# Patient Record
Sex: FEMALE | Race: WHITE | NOT HISPANIC OR LATINO | Employment: OTHER | URBAN - METROPOLITAN AREA
[De-identification: names, ages, dates, MRNs, and addresses within clinical notes are randomized per-mention and may not be internally consistent; named-entity substitution may affect disease eponyms.]

---

## 2017-06-30 ENCOUNTER — CONVERSION ENCOUNTER (OUTPATIENT)
Dept: GENERAL RADIOLOGY | Facility: HOSPITAL | Age: 67
End: 2017-06-30

## 2018-10-22 ENCOUNTER — CONVERSION ENCOUNTER (OUTPATIENT)
Dept: GENERAL RADIOLOGY | Facility: HOSPITAL | Age: 68
End: 2018-10-22

## 2018-12-28 ENCOUNTER — CONVERSION ENCOUNTER (OUTPATIENT)
Dept: OTOLARYNGOLOGY | Facility: CLINIC | Age: 68
End: 2018-12-28

## 2018-12-28 ENCOUNTER — OFFICE VISIT CONVERTED (OUTPATIENT)
Dept: OTOLARYNGOLOGY | Facility: CLINIC | Age: 68
End: 2018-12-28
Attending: OTOLARYNGOLOGY

## 2019-03-12 ENCOUNTER — HOSPITAL ENCOUNTER (OUTPATIENT)
Dept: GENERAL RADIOLOGY | Facility: HOSPITAL | Age: 69
Discharge: HOME | End: 2019-03-12
Attending: NURSE PRACTITIONER

## 2019-03-12 LAB
CREAT BLD-MCNC: 1.4 MG/DL (ref 0.6–1.4)
GFR SERPLBLD BASED ON 1.73 SQ M-ARVRAT: 38 ML/MIN/{1.73_M2}

## 2019-05-15 ENCOUNTER — HOSPITAL ENCOUNTER (OUTPATIENT)
Dept: SLEEP MEDICINE | Facility: HOSPITAL | Age: 69
Discharge: HOME | End: 2019-05-15
Attending: PSYCHIATRY & NEUROLOGY

## 2019-11-14 ENCOUNTER — CONVERSION ENCOUNTER (OUTPATIENT)
Dept: CARDIOLOGY | Facility: CLINIC | Age: 69
End: 2019-11-14

## 2019-11-14 ENCOUNTER — OFFICE VISIT CONVERTED (OUTPATIENT)
Dept: CARDIOLOGY | Facility: CLINIC | Age: 69
End: 2019-11-14
Attending: INTERNAL MEDICINE

## 2020-03-29 ENCOUNTER — HOSPITAL ENCOUNTER (OUTPATIENT)
Dept: URGENT CARE | Facility: CLINIC | Age: 70
Discharge: HOME | End: 2020-03-29

## 2020-07-22 ENCOUNTER — HOSPITAL ENCOUNTER (OUTPATIENT)
Dept: SLEEP MEDICINE | Facility: HOSPITAL | Age: 70
Discharge: HOME | End: 2020-07-22
Attending: PSYCHIATRY & NEUROLOGY

## 2021-05-15 VITALS
DIASTOLIC BLOOD PRESSURE: 80 MMHG | WEIGHT: 176 LBS | SYSTOLIC BLOOD PRESSURE: 170 MMHG | HEIGHT: 64 IN | HEART RATE: 60 BPM | BODY MASS INDEX: 30.05 KG/M2

## 2021-05-16 VITALS
OXYGEN SATURATION: 95 % | DIASTOLIC BLOOD PRESSURE: 80 MMHG | SYSTOLIC BLOOD PRESSURE: 131 MMHG | BODY MASS INDEX: 28.38 KG/M2 | HEIGHT: 64 IN | TEMPERATURE: 97.6 F | RESPIRATION RATE: 18 BRPM | HEART RATE: 75 BPM | WEIGHT: 166.25 LBS

## 2021-05-22 ENCOUNTER — TRANSCRIBE ORDERS (OUTPATIENT)
Dept: ADMINISTRATIVE | Facility: HOSPITAL | Age: 71
End: 2021-05-22

## 2021-05-22 DIAGNOSIS — Z12.31 SCREENING MAMMOGRAM, ENCOUNTER FOR: Primary | ICD-10-CM

## 2021-07-08 ENCOUNTER — HOSPITAL ENCOUNTER (OUTPATIENT)
Dept: MAMMOGRAPHY | Facility: HOSPITAL | Age: 71
Discharge: HOME | End: 2021-07-08
Admitting: NURSE PRACTITIONER

## 2021-07-08 DIAGNOSIS — Z12.31 SCREENING MAMMOGRAM, ENCOUNTER FOR: ICD-10-CM

## 2021-07-08 PROCEDURE — 77063 BREAST TOMOSYNTHESIS BI: CPT | Performed by: RADIOLOGY

## 2021-07-08 PROCEDURE — 77063 BREAST TOMOSYNTHESIS BI: CPT

## 2021-07-08 PROCEDURE — 77067 SCR MAMMO BI INCL CAD: CPT | Performed by: RADIOLOGY

## 2021-07-08 PROCEDURE — 77067 SCR MAMMO BI INCL CAD: CPT

## 2021-07-09 ENCOUNTER — APPOINTMENT (OUTPATIENT)
Dept: MAMMOGRAPHY | Facility: HOSPITAL | Age: 71
End: 2021-07-09

## 2021-07-21 ENCOUNTER — OFFICE VISIT (OUTPATIENT)
Dept: SLEEP MEDICINE | Facility: HOSPITAL | Age: 71
End: 2021-07-21

## 2021-07-21 VITALS
SYSTOLIC BLOOD PRESSURE: 154 MMHG | WEIGHT: 195 LBS | BODY MASS INDEX: 33.29 KG/M2 | DIASTOLIC BLOOD PRESSURE: 93 MMHG | HEART RATE: 65 BPM | OXYGEN SATURATION: 97 % | HEIGHT: 64 IN

## 2021-07-21 DIAGNOSIS — G47.33 OSA (OBSTRUCTIVE SLEEP APNEA): Primary | ICD-10-CM

## 2021-07-21 PROCEDURE — G0463 HOSPITAL OUTPT CLINIC VISIT: HCPCS

## 2022-07-20 ENCOUNTER — TRANSCRIBE ORDERS (OUTPATIENT)
Dept: ADMINISTRATIVE | Facility: HOSPITAL | Age: 72
End: 2022-07-20

## 2022-07-20 DIAGNOSIS — I70.0 ATHEROSCLEROSIS OF AORTA: ICD-10-CM

## 2022-07-20 DIAGNOSIS — M54.50 LOW BACK PAIN, UNSPECIFIED BACK PAIN LATERALITY, UNSPECIFIED CHRONICITY, UNSPECIFIED WHETHER SCIATICA PRESENT: Primary | ICD-10-CM

## 2022-08-09 ENCOUNTER — APPOINTMENT (OUTPATIENT)
Dept: CT IMAGING | Facility: HOSPITAL | Age: 72
End: 2022-08-09

## 2022-08-09 ENCOUNTER — APPOINTMENT (OUTPATIENT)
Dept: MRI IMAGING | Facility: HOSPITAL | Age: 72
End: 2022-08-09

## 2022-08-30 ENCOUNTER — HOSPITAL ENCOUNTER (OUTPATIENT)
Dept: MRI IMAGING | Facility: HOSPITAL | Age: 72
Discharge: HOME | End: 2022-08-30

## 2022-08-30 ENCOUNTER — HOSPITAL ENCOUNTER (OUTPATIENT)
Dept: CT IMAGING | Facility: HOSPITAL | Age: 72
Discharge: HOME | End: 2022-08-30

## 2022-08-30 DIAGNOSIS — M54.50 LOW BACK PAIN, UNSPECIFIED BACK PAIN LATERALITY, UNSPECIFIED CHRONICITY, UNSPECIFIED WHETHER SCIATICA PRESENT: ICD-10-CM

## 2022-08-30 DIAGNOSIS — I70.0 ATHEROSCLEROSIS OF AORTA: ICD-10-CM

## 2022-08-30 LAB
CREAT BLDA-MCNC: 1.5 MG/DL
EGFRCR SERPLBLD CKD-EPI 2021: 36.4 ML/MIN/1.73

## 2022-08-30 PROCEDURE — 82565 ASSAY OF CREATININE: CPT

## 2022-08-30 PROCEDURE — 0 IOPAMIDOL PER 1 ML: Performed by: NURSE PRACTITIONER

## 2022-08-30 PROCEDURE — 72148 MRI LUMBAR SPINE W/O DYE: CPT

## 2022-08-30 PROCEDURE — 74174 CTA ABD&PLVS W/CONTRAST: CPT

## 2022-08-30 RX ADMIN — IOPAMIDOL 100 ML: 755 INJECTION, SOLUTION INTRAVENOUS at 13:21

## 2022-09-16 ENCOUNTER — TRANSCRIBE ORDERS (OUTPATIENT)
Dept: ADMINISTRATIVE | Facility: HOSPITAL | Age: 72
End: 2022-09-16

## 2022-09-16 ENCOUNTER — HOSPITAL ENCOUNTER (OUTPATIENT)
Dept: GENERAL RADIOLOGY | Facility: HOSPITAL | Age: 72
Discharge: HOME | End: 2022-09-16
Admitting: STUDENT IN AN ORGANIZED HEALTH CARE EDUCATION/TRAINING PROGRAM

## 2022-09-16 DIAGNOSIS — M54.16 LUMBAR RADICULOPATHY: Primary | ICD-10-CM

## 2022-09-16 DIAGNOSIS — M54.16 LUMBAR RADICULOPATHY: ICD-10-CM

## 2022-09-16 PROCEDURE — 72114 X-RAY EXAM L-S SPINE BENDING: CPT

## 2022-10-11 ENCOUNTER — OFFICE VISIT (OUTPATIENT)
Dept: NEUROSURGERY | Facility: CLINIC | Age: 72
End: 2022-10-11

## 2022-10-11 VITALS
HEIGHT: 64 IN | BODY MASS INDEX: 35.15 KG/M2 | SYSTOLIC BLOOD PRESSURE: 157 MMHG | DIASTOLIC BLOOD PRESSURE: 61 MMHG | WEIGHT: 205.9 LBS | HEART RATE: 60 BPM

## 2022-10-11 DIAGNOSIS — M47.27 OSTEOARTHRITIS OF SPINE WITH RADICULOPATHY, LUMBOSACRAL REGION: ICD-10-CM

## 2022-10-11 DIAGNOSIS — M48.062 SPINAL STENOSIS, LUMBAR REGION, WITH NEUROGENIC CLAUDICATION: Primary | ICD-10-CM

## 2022-10-11 PROCEDURE — 99215 OFFICE O/P EST HI 40 MIN: CPT | Performed by: NURSE PRACTITIONER

## 2022-10-11 RX ORDER — ASPIRIN 81 MG/1
81 TABLET, CHEWABLE ORAL DAILY
COMMUNITY

## 2022-10-11 RX ORDER — RIZATRIPTAN BENZOATE 10 MG/1
10 TABLET ORAL ONCE AS NEEDED
COMMUNITY

## 2022-10-11 NOTE — PROGRESS NOTES
"Chief Complaint  Back Pain and Leg Pain    Subjective          Valeria Martínez who is a 72 y.o. year old female who presents to [facility] NEUROLOG[facility] NEUR[facility]GERY for evaluation of lumbar spine.    The patient complains of pain located in the lumbar spine.  Patients states the pain has been present for several years.  The pain came on gradually.  Her pain has worsened over the past year. The pain scale level is 8.  The pain does radiate. Dermatomes are located bilaterally Lumbar at: into the lateral calves, worse on the right..  The pain is Intermittent and waxing/waning and described as sharp and aching.  The pain is worse at no particular time of day. Patient states prolonged standing and prolonged walking makes the pain worse.  Patient states rest makes the pain better.    Associated Symptoms Include: Weakness  Conservative Interventions Include: She saw pain management recently. There was discussion for lumbar epidural injection. She is scheduled for an injection next week. She has not had any type of physical therapy.     Was this the result of an injury or accident?: No    History of Previous Spinal Surgery?: No    Nicotine use: non-smoker    BMI: Body mass index is 35.34 kg/m².      Review of Systems   Musculoskeletal: Positive for arthralgias, back pain, gait problem and myalgias.   Neurological: Positive for weakness and numbness.   All other systems reviewed and are negative.       Objective   Vital Signs:   /61   Pulse 60   Ht 162.6 cm (64\")   Wt 93.4 kg (205 lb 14.4 oz)   BMI 35.34 kg/m²       Physical Exam  Vitals reviewed.   Constitutional:       Appearance: Normal appearance.   Musculoskeletal:      Lumbar back: Tenderness present. Negative right straight leg raise test and negative left straight leg raise test.      Right hip: No tenderness. Normal range of motion.      Left hip: No tenderness. Normal range of motion.   Neurological:      Mental Status: She is alert and " oriented to person, place, and time.      Motor: Motor strength is normal.      Gait: Gait is intact.      Deep Tendon Reflexes:      Reflex Scores:       Patellar reflexes are 2+ on the right side and 2+ on the left side.       Achilles reflexes are 2+ on the right side and 2+ on the left side.       Neurologic Exam     Mental Status   Oriented to person, place, and time.   Level of consciousness: alert    Motor Exam   Muscle bulk: normal  Overall muscle tone: normal    Strength   Strength 5/5 throughout.     Sensory Exam   Light touch normal.     Gait, Coordination, and Reflexes     Gait  Gait: normal    Reflexes   Right patellar: 2+  Left patellar: 2+  Right achilles: 2+  Left achilles: 2+  Right ankle clonus: absent  Left ankle clonus: absent       Result Review :       Data reviewed: Radiologic studies MRI Lumbar Spine on 8/30/22 at [facility] personally reviewed. Multilevel spondylosis with degenerative Schmorl's nodes. Slight anterolisthesis of L3 on L4 and L4 on L5. Severe spinal stenosis at L4/5 with crowding of the cauda equina. Moderately severe spinal stenosis at L3/4. Moderate spinal stenosis at L5/S1.           Assessment and Plan {CC Problem List  Visit Diagnosis   ROS  Review (Popup)  [facility]  Quality  BestPractice  Medications  SmartSets  SnapShot Encounters  Media :23}   Diagnoses and all orders for this visit:    1. Spinal stenosis, lumbar region, with neurogenic claudication (Primary)  -     Ambulatory Referral to Physical Therapy Evaluate and treat; Heat; Moist heat; Cross Fiber; Stretching (Traction), ROM, Strengthening    2. Osteoarthritis of spine with radiculopathy, lumbosacral region  -     Ambulatory Referral to Physical Therapy Evaluate and treat; Heat; Moist heat; Cross Fiber; Stretching (Traction), ROM, Strengthening    Pt presenting for evaluation of low back and leg pain. We reviewed her MRI Lumbar Spine, demonstrating multilevel spondylosis with spondylolisthesis and  spinal stenosis, severe at L4/5. She is demonstrating symptoms of neurogenic claudication. Her pain is most significant in the back and we discussed that surgery would not improve her back pain. She has not had any conservative therapy. She is scheduled for lumbar epidural injection next week. Will refer to physical therapy for traction, range of motion, E-stim. Follow up in 8 weeks. If no improvement would consider minimally invasive laminectomy L4/5.     I spent 42 minutes caring for Valeria on this date of service. This time includes time spent by me in the following activities:preparing for the visit, reviewing tests, obtaining and/or reviewing a separately obtained history, performing a medically appropriate examination and/or evaluation , counseling and educating the patient/family/caregiver, referring and communicating with other health care professionals , documenting information in the medical record and independently interpreting results and communicating that information with the patient/family/caregiver.    Follow Up   Return in about 8 weeks (around 12/6/2022).  Patient was given instructions and counseling regarding her condition or for health maintenance advice.     -Keep appointment for Lumbar epidural injection  -Physical therapy  -Follow up in 8 weeks

## 2022-10-14 ENCOUNTER — PATIENT ROUNDING (BHMG ONLY) (OUTPATIENT)
Dept: NEUROSURGERY | Facility: CLINIC | Age: 72
End: 2022-10-14

## 2022-10-14 NOTE — PROGRESS NOTES
Nain, My name is Peggy     I am  The Practice Manager with [facility] NEUROSURGERY [facility] NEUROLOGY [facility] NEUROSURGERY and want to officially welcome you to our practice and ask about your recent visit.    Tell me about your visit with us. What things went well?         We're always looking for ways to make our patients' experiences even better. Do you have recommendations on ways we may improve?      Overall were you satisfied with your first visit to our practice?      I appreciate you taking the time to answer these question. Is there anything else I can do for you?     You may also receive a brief survey via e-mail or mail from [employer] about our patient satisfaction, if you could please take a moment and answer it would be helpful to the practice growth.    Thank you, and have a great day.    Peggy

## 2022-10-18 ENCOUNTER — OFFICE VISIT (OUTPATIENT)
Dept: SLEEP MEDICINE | Facility: HOSPITAL | Age: 72
End: 2022-10-18

## 2022-10-18 VITALS — HEIGHT: 64 IN | WEIGHT: 206 LBS | BODY MASS INDEX: 35.17 KG/M2 | OXYGEN SATURATION: 98 % | HEART RATE: 64 BPM

## 2022-10-18 DIAGNOSIS — Z99.89 OSA ON CPAP: Primary | ICD-10-CM

## 2022-10-18 DIAGNOSIS — F51.04 PSYCHOPHYSIOLOGICAL INSOMNIA: ICD-10-CM

## 2022-10-18 DIAGNOSIS — G47.33 OSA ON CPAP: Primary | ICD-10-CM

## 2022-10-18 DIAGNOSIS — E66.9 OBESITY (BMI 30-39.9): ICD-10-CM

## 2022-10-18 PROCEDURE — G0463 HOSPITAL OUTPT CLINIC VISIT: HCPCS | Performed by: FAMILY MEDICINE

## 2022-10-18 PROCEDURE — 99214 OFFICE O/P EST MOD 30 MIN: CPT | Performed by: FAMILY MEDICINE

## 2022-10-18 RX ORDER — TRAZODONE HYDROCHLORIDE 50 MG/1
TABLET ORAL
Qty: 45 TABLET | Refills: 1 | Status: SHIPPED | OUTPATIENT
Start: 2022-10-18 | End: 2023-02-08

## 2022-10-18 NOTE — PROGRESS NOTES
Follow Up Sleep Disorders Center Note     Chief Complaint:  MARIBETH     Primary Care Physician: Aster Parra APRN Patricia STEPHANIE Martínez is a 72 y.o.female  was last seen at [facility] sleep lab: 7/21/2021.  New patient to me.  Presents today for follow-up of mixed sleep apnea.  Sleep study results reviewed showed overall AHI 14.9 PLM index 71.9 events per hour.  At last visit was using CPAP machine well however having difficulty initiating sleep.  At that time was taking amitriptyline 10 mg nightly prescribed by PCP.  At last visit discussed considering increasing dosage and to try bright light exposure during the day and set up a regular wake time no matter what time she goes to bed.    Today patient reports bedtime 12 AM to 3 AM wake time 6:30 AM.  Patient reports sleeps for 1 to 2 hours and wakes up unable to go back to sleep for several hours.  Nasal pillow mask fits well does not leak air.    Current medications include hydroxyzine 25 mg twice daily.  Currently not taking amitriptyline anymore.    Results Review:  DME is [employer].  Downloads between 9/16/2022 - 1/15/2022.  Average usage is 5 hours 48 minutes.  Average AHI is 3.2.  Average AutoCPAP pressure is 11.4 cm H2O.    Current Medications:    Current Outpatient Medications:   •  ascorbic acid (VITAMIN C) 1000 MG tablet, Vitamin C 1,000 mg oral tablet take 1 tablet by oral route daily   Active, Disp: , Rfl:   •  aspirin 81 MG chewable tablet, Chew 1 tablet Daily., Disp: , Rfl:   •  cetirizine (zyrTEC) 10 MG tablet, , Disp: , Rfl:   •  fluticasone (FLONASE) 50 MCG/ACT nasal spray, 2 sprays into the nostril(s) as directed by provider Daily., Disp: , Rfl:   •  hydrOXYzine (ATARAX) 25 MG tablet, , Disp: , Rfl:   •  lisinopril (PRINIVIL,ZESTRIL) 20 MG tablet, , Disp: , Rfl:   •  montelukast (SINGULAIR) 10 MG tablet, , Disp: , Rfl:   •  propranolol (INDERAL) 40 MG tablet, , Disp: , Rfl:   •  rizatriptan (MAXALT) 10 MG tablet, Take 1 tablet by mouth 1 (One) Time As  "Needed for Migraine. May repeat in 2 hours if needed, Disp: , Rfl:   •  rosuvastatin (CRESTOR) 5 MG tablet, , Disp: , Rfl:   •  Synthroid 125 MCG tablet, , Disp: , Rfl:   •  tolterodine LA (DETROL LA) 4 MG 24 hr capsule, , Disp: , Rfl:   •  vitamin E 100 UNIT capsule, vitamin E 100 unit oral capsule take 1 capsule by oral route daily   Active, Disp: , Rfl:   •  traZODone (DESYREL) 50 MG tablet, Take 1/2 tab QHS. Can inc to 1 tab in 3d if needed. Can inc to 1.5 tab in 3d if needed. Can inc to 2 tab in 3d if needed., Disp: 45 tablet, Rfl: 1   also entered in Sleep Questionnaire    Patient  has a past medical history of Allergies, Disease of thyroid gland, Hyperlipidemia, and Hypertension.    Social History:    Social History     Socioeconomic History   • Marital status:    Tobacco Use   • Smoking status: Never   • Smokeless tobacco: Never   Vaping Use   • Vaping Use: Never used   Substance and Sexual Activity   • Alcohol use: Not Currently   • Drug use: Never   • Sexual activity: Defer       Allergies:  Codeine and Levofloxacin    Vital Signs:    Vitals:    10/18/22 0900   Pulse: 64   SpO2: 98%   Weight: 93.4 kg (206 lb)   Height: 162.6 cm (64.02\")     Body mass index is 35.34 kg/m².    REVIEW OF SYSTEMS.  Full review of systems available on the intake form which is scanned in the media tab.  The relevant positive are noted below  1. Daytime excessive sleepiness with [place] Sleepiness Scale :Total score: 6   2. Snoring  3. Postnasal drip nasal congestion dry mouth      Physical exam:  Vitals:    10/18/22 0900   Pulse: 64   SpO2: 98%   Weight: 93.4 kg (206 lb)   Height: 162.6 cm (64.02\")    Body mass index is 35.34 kg/m².    HEENT: Head is atraumatic, normocephalic  Eyes: pupils are round equal and reacting to light and accommodation, conjunctiva normal  RESPIRATORY SYSTEM: Regular respirations  CARDIOVASULAR SYSTEM: Regular rate  EXTREMITES: No cyanosis, clubbing  NEUROLOGICAL SYSTEM: Oriented x 3, no gross " motor defects, gait normal    Impression:  1. MARIBETH on CPAP    2. Obesity (BMI 30-39.9)    3. Psychophysiological insomnia        Obstructive sleep apnea adequately treated with auto CPAP 11-20 cm H2O with good compliance and usage and no complaints of hypersomnolence.  Patient asked about considering Ambien for her insomnia.  Discussed considering CBT-I; discussed Dr. Crowder does reimburse for Medicare patients; patient reported this was not financially possible for her at this time.  Advised her Ambien is not meant for long-term use for insomnia given her age and possible side effects.  Discussed considering trazodone.  Reaffirmed Dr. Spicer's recommendation for bright light therapy in the morning.  Return to clinic in 6 months for follow-up or sooner if needed.    Patient uses the CPAP device and benefits from its use in terms of reduction of hypersomnia and snoring.Weight loss will be strongly beneficial to reduce the severity of sleep-disordered breathing.  Caution during activities that require prolonged concentration is strongly advised if sleepiness returns. Changing of PAP supplies regularly is important for effective use. Patient needs to change cushion on the mask or plugs on nasal pillows along with disposable filters once every month and change mask frame, tubing, headgear and Velcro straps every 6 months at the minimum.    Cristobal Dunbar MD  Sleep Medicine  10/18/22  11:19 EDT

## 2022-12-06 ENCOUNTER — OFFICE VISIT (OUTPATIENT)
Dept: NEUROSURGERY | Facility: CLINIC | Age: 72
End: 2022-12-06

## 2022-12-06 VITALS
DIASTOLIC BLOOD PRESSURE: 55 MMHG | HEIGHT: 64 IN | BODY MASS INDEX: 35.29 KG/M2 | HEART RATE: 57 BPM | SYSTOLIC BLOOD PRESSURE: 150 MMHG | WEIGHT: 206.7 LBS

## 2022-12-06 DIAGNOSIS — M48.062 SPINAL STENOSIS, LUMBAR REGION, WITH NEUROGENIC CLAUDICATION: Primary | ICD-10-CM

## 2022-12-06 DIAGNOSIS — M47.27 OSTEOARTHRITIS OF SPINE WITH RADICULOPATHY, LUMBOSACRAL REGION: ICD-10-CM

## 2022-12-06 PROCEDURE — 99213 OFFICE O/P EST LOW 20 MIN: CPT | Performed by: NURSE PRACTITIONER

## 2022-12-06 NOTE — PROGRESS NOTES
Chief Complaint  Follow-up (Patient has been doing PT, office canceled her injections & she hasn't rescheduled yet.)    Subjective          Valeria Martínez who is a 72 y.o. year old female who presents to [facility] NEUROLOGY [facility] NEUROSURGERY for follow up of back and leg pain. Recent physical therapy.     History of Present Illness  Pt has been working with physical therapy for 3-4 weeks. This is providing transient relief. She is having pain into the low back and bilateral lower extremities, posterior thigh and calves. Right leg is worse.     She was scheduled for her injection which had to be canceled. She has not rescheduled her appointment with them yet as she been out of town.           Interval History 10/11/22    Valeria Martínez who is a 72 y.o. year old female who presents to [facility] NEUROLOGY [facility] NEUROSURGERY for evaluation of lumbar spine.     The patient complains of pain located in the lumbar spine.  Patients states the pain has been present for several years.  The pain came on gradually.  Her pain has worsened over the past year. The pain scale level is 8.  The pain does radiate. Dermatomes are located bilaterally Lumbar at: into the lateral calves, worse on the right..  The pain is Intermittent and waxing/waning and described as sharp and aching.  The pain is worse at no particular time of day. Patient states prolonged standing and prolonged walking makes the pain worse.  Patient states rest makes the pain better.     Associated Symptoms Include: Weakness  Conservative Interventions Include: She saw pain management recently. There was discussion for lumbar epidural injection. She is scheduled for an injection next week. She has not had any type of physical therapy.      Was this the result of an injury or accident?: No     History of Previous Spinal Surgery?: No     Nicotine use: non-smoker     BMI: Body mass index is 35.34 kg/m².    Recent Interventions: See  "above      Review of Systems   Musculoskeletal: Positive for arthralgias, back pain, gait problem and myalgias.   Neurological: Positive for weakness and numbness.   All other systems reviewed and are negative.       Objective   Vital Signs:   /55 (BP Location: Left arm, Patient Position: Sitting, Cuff Size: Large Adult)   Pulse 57   Ht 162.6 cm (64.02\")   Wt 93.8 kg (206 lb 11.2 oz)   BMI 35.46 kg/m²       Physical Exam  Vitals reviewed.   Constitutional:       Appearance: Normal appearance.   Musculoskeletal:      Lumbar back: Tenderness present. Negative right straight leg raise test and negative left straight leg raise test.      Right hip: No tenderness. Normal range of motion.      Left hip: No tenderness. Normal range of motion.   Neurological:      Mental Status: She is alert and oriented to person, place, and time.      Motor: Motor strength is normal.      Gait: Gait is intact.      Deep Tendon Reflexes:      Reflex Scores:       Patellar reflexes are 2+ on the right side and 2+ on the left side.       Achilles reflexes are 2+ on the right side and 2+ on the left side.       Neurologic Exam     Mental Status   Oriented to person, place, and time.   Level of consciousness: alert    Motor Exam   Muscle bulk: normal  Overall muscle tone: normal    Strength   Strength 5/5 throughout.     Sensory Exam   Light touch normal.     Gait, Coordination, and Reflexes     Gait  Gait: normal    Reflexes   Right patellar: 2+  Left patellar: 2+  Right achilles: 2+  Left achilles: 2+  Right ankle clonus: absent  Left ankle clonus: absent       Result Review :       Data reviewed: Radiologic studies MRI Lumbar Spine on 8/30/22 at [facility] personally reviewed. Multilevel spondylosis with degenerative Schmorl's nodes. Slight anterolisthesis of L3 on L4 and L4 on L5. Severe spinal stenosis at L4/5 with crowding of the cauda equina. Moderately severe spinal stenosis at L3/4. Moderate spinal stenosis at L5/S1.          "   Assessment and Plan {CC Problem List  Visit Diagnosis   ROS  Review (Popup)  Health Maintenance  Quality  BestPractice  Medications  SmartSets  SnapShot Encounters  Media :23}   Diagnoses and all orders for this visit:    1. Spinal stenosis, lumbar region, with neurogenic claudication (Primary)    2. Osteoarthritis of spine with radiculopathy, lumbosacral region    Pt has been working with physical therapy. She has not received injection yet with pain management. We discussed that surgery would only improve her leg pain, though would not benefit back pain. She will proceed with injections. Follow up in 8 weeks and if not significant improvement will discuss laminectomy.       Follow Up {Instructions Charge Capture  Follow-up Communications :23}  Return in about 8 weeks (around 1/31/2023).  Patient was given instructions and counseling regarding her condition or for health maintenance advice.       -Keep appointment with pain management for lumbar epidural injection  -Continue physical therapy  -Follow up in 8 weeks

## 2022-12-06 NOTE — PATIENT INSTRUCTIONS
-Keep appointment with pain management for lumbar epidural injection  -Continue physical therapy  -Follow up in 8 weeks

## 2023-02-03 ENCOUNTER — TRANSCRIBE ORDERS (OUTPATIENT)
Dept: ADMINISTRATIVE | Facility: HOSPITAL | Age: 73
End: 2023-02-03
Payer: MEDICARE

## 2023-02-03 DIAGNOSIS — N18.32 STAGE 3B CHRONIC KIDNEY DISEASE: ICD-10-CM

## 2023-02-03 DIAGNOSIS — I70.1 RENAL ARTERY STENOSIS, NATIVE, BILATERAL: Primary | ICD-10-CM

## 2023-02-08 ENCOUNTER — OFFICE VISIT (OUTPATIENT)
Dept: NEUROSURGERY | Facility: CLINIC | Age: 73
End: 2023-02-08
Payer: MEDICARE

## 2023-02-08 VITALS
DIASTOLIC BLOOD PRESSURE: 71 MMHG | HEIGHT: 64 IN | HEART RATE: 60 BPM | SYSTOLIC BLOOD PRESSURE: 153 MMHG | BODY MASS INDEX: 35.85 KG/M2 | WEIGHT: 210 LBS

## 2023-02-08 DIAGNOSIS — M48.062 SPINAL STENOSIS, LUMBAR REGION, WITH NEUROGENIC CLAUDICATION: ICD-10-CM

## 2023-02-08 DIAGNOSIS — M47.27 OSTEOARTHRITIS OF SPINE WITH RADICULOPATHY, LUMBOSACRAL REGION: Primary | ICD-10-CM

## 2023-02-08 PROCEDURE — 99213 OFFICE O/P EST LOW 20 MIN: CPT | Performed by: NURSE PRACTITIONER

## 2023-02-08 RX ORDER — TOLTERODINE 4 MG/1
4 CAPSULE, EXTENDED RELEASE ORAL DAILY
COMMUNITY
Start: 2023-01-10

## 2023-02-08 RX ORDER — ROSUVASTATIN CALCIUM 5 MG/1
5 TABLET, COATED ORAL DAILY
COMMUNITY
Start: 2023-01-10

## 2023-02-08 RX ORDER — CETIRIZINE HYDROCHLORIDE 10 MG/1
10 TABLET ORAL
COMMUNITY
Start: 2023-01-10

## 2023-02-08 RX ORDER — PROPRANOLOL HYDROCHLORIDE 40 MG/1
40 TABLET ORAL DAILY
COMMUNITY
Start: 2023-01-10

## 2023-02-08 RX ORDER — EPINEPHRINE 0.3 MG/.3ML
INJECTION SUBCUTANEOUS
COMMUNITY
Start: 2022-11-01

## 2023-02-08 NOTE — PROGRESS NOTES
Chief Complaint  Follow-up (Patient has had 1 injection with pain management, it helped but pain returning now. Patient did not want to do PT, she doesn't like to leave her home that often.)    Subjective          Valeria Martínez who is a 73 y.o. year old female who presents to [facility]UROLOGY [facility] NEUROSURGERY for follow up of low back pain with radiculopathy.     History of Present Illness  Pt received one LESI with pain management on 12/30/22. She appreciated excellent benefit, lasting several weeks. Pt is starting to return, though not to the significance it was previously. Pain is primarily in the back and into the thigh, not going past the knee. She is scheduled for follow up with pain management within the next few weeks to discuss second epidural steroid injection.       Interval History   Valeria Martínez who is a 72 y.o. year old female who presents to [facility] NEUROLOGY & NEUROSURGERY for follow up of back and leg pain. Recent physical therapy.      History of Present Illness  Pt has been working with physical therapy for 3-4 weeks. This is providing transient relief. She is having pain into the low back and bilateral lower extremities, posterior thigh and calves. Right leg is worse.      She was scheduled for her injection which had to be canceled. She has not rescheduled her appointment with them yet as she been out of town.               Interval History 10/11/22     Valeria Martínez who is a 72 y.o. year old female who presents to [facility] NEUROLOGY & NEUROSURGERY for evaluation of lumbar spine.     The patient complains of pain located in the lumbar spine.  Patients states the pain has been present for several years.  The pain came on gradually.  Her pain has worsened over the past year. The pain scale level is 8.  The pain does radiate. Dermatomes are located bilaterally Lumbar at: into the lateral calves, worse on the right..  The pain  "is Intermittent and waxing/waning and described as sharp and aching.  The pain is worse at no particular time of day. Patient states prolonged standing and prolonged walking makes the pain worse.  Patient states rest makes the pain better.     Associated Symptoms Include: Weakness  Conservative Interventions Include: She saw pain management recently. There was discussion for lumbar epidural injection. She is scheduled for an injection next week. She has not had any type of physical therapy.      Was this the result of an injury or accident?: No     History of Previous Spinal Surgery?: No     Nicotine use: non-smoker     BMI: Body mass index is 35.34 kg/m².    Recent Interventions: LESI      Review of Systems   Musculoskeletal: Positive for arthralgias, back pain and myalgias.   All other systems reviewed and are negative.       Objective   Vital Signs:   /71 (BP Location: Left arm, Patient Position: Sitting, Cuff Size: Large Adult)   Pulse 60   Ht 162.6 cm (64.02\")   Wt 95.3 kg (210 lb)   BMI 36.02 kg/m²       Physical Exam  Vitals reviewed.   Constitutional:       Appearance: Normal appearance.   Musculoskeletal:      Lumbar back: Tenderness present. Negative right straight leg raise test and negative left straight leg raise test.   Neurological:      Mental Status: She is alert and oriented to person, place, and time.      Motor: Motor strength is normal.      Gait: Gait is intact.        Neurologic Exam     Mental Status   Oriented to person, place, and time.   Level of consciousness: alert    Motor Exam   Muscle bulk: normal  Overall muscle tone: normal    Strength   Strength 5/5 throughout.     Sensory Exam   Light touch normal.     Gait, Coordination, and Reflexes     Gait  Gait: normal       Result Review :       Data reviewed: Radiologic studies MRI Lumbar Spine on 8/30/22 at [facility] personally reviewed. Multilevel spondylosis with degenerative Schmorl's nodes. Slight anterolisthesis of L3 on L4 and L4 " on L5. Severe spinal stenosis at L4/5 with crowding of the cauda equina. Moderately severe spinal stenosis at L3/4. Moderate spinal stenosis at L5/S1.             Assessment and Plan    Diagnoses and all orders for this visit:    1. Osteoarthritis of spine with radiculopathy, lumbosacral region (Primary)    2. Spinal stenosis, lumbar region, with neurogenic claudication    Pt's pain is improving following epidural steroid injection. We discussed proceeding with second injection. She will follow up in 6 weeks.       Follow Up   Return in about 6 weeks (around 3/22/2023) for Tuesday.  Patient was given instructions and counseling regarding her condition or for health maintenance advice.       -Keep appointment with pain management  -Follow up in 6 weeks

## 2023-03-24 ENCOUNTER — HOSPITAL ENCOUNTER (OUTPATIENT)
Dept: CARDIOLOGY | Facility: HOSPITAL | Age: 73
Discharge: HOME | End: 2023-03-24
Admitting: SURGERY
Payer: MEDICARE

## 2023-03-24 DIAGNOSIS — N18.32 STAGE 3B CHRONIC KIDNEY DISEASE: ICD-10-CM

## 2023-03-24 DIAGNOSIS — I70.1 RENAL ARTERY STENOSIS, NATIVE, BILATERAL: ICD-10-CM

## 2023-03-24 LAB
BH CV ECHO MEAS - DIST REN A EDV LEFT: 14 CM/S
BH CV ECHO MEAS - DIST REN A PSV LEFT: 91 CM/S
BH CV ECHO MEAS - MID REN A EDV LEFT: 15 CM/S
BH CV ECHO MEAS - MID REN A PSV LEFT: 68 CM/S
BH CV ECHO MEAS - PROX REN A EDV LEFT: 25 CM/S
BH CV ECHO MEAS - PROX REN A PSV LEFT: 115 CM/S
BH CV VAS BP LEFT ARM: NORMAL MMHG
BH CV VAS BP RIGHT ARM: NORMAL MMHG
BH CV VAS KIDNEY HEIGHT LEFT: 5 CM
BH CV VAS RENAL AORTIC MID EDV: 22 CM/S
BH CV VAS RENAL AORTIC MID PSV: 78 CM/S
BH CV VAS RENAL HILUM LEFT EDV: 10 CM/S
BH CV VAS RENAL HILUM LEFT PSV: 40 CM/S
BH CV VAS RENAL HILUM RIGHT EDV: 16 CM/S
BH CV VAS RENAL HILUM RIGHT PSV: 59 CM/S
BH CV XLRA MEAS - KID L LEFT: 10 CM
BH CV XLRA MEAS DIST REN A EDV RIGHT: 16 CM/S
BH CV XLRA MEAS DIST REN A PSV RIGHT: 65 CM/S
BH CV XLRA MEAS KID H RIGHT: 5 CM
BH CV XLRA MEAS KID L RIGHT: 11 CM
BH CV XLRA MEAS KID W RIGHT: 6 CM
BH CV XLRA MEAS MID REN A EDV RIGHT: 17 CM/S
BH CV XLRA MEAS MID REN A PSV RIGHT: 71 CM/S
BH CV XLRA MEAS PROX REN A EDV RIGHT: 18 CM/S
BH CV XLRA MEAS PROX REN A PSV RIGHT: 76 CM/S
BH CV XLRA MEAS RAR LEFT: 1.5
BH CV XLRA MEAS RAR RIGHT: 1
LEFT KIDNEY WIDTH: 5 CM
LEFT RENAL UPPER PARENCHYMA MAX: 23 CM/S
LEFT RENAL UPPER PARENCHYMA MIN: 7 CM/S
LEFT RENAL UPPER PARENCHYMA RI: 0.7
MAXIMAL PREDICTED HEART RATE: 145 BPM
RIGHT RENAL UPPER PARENCHYMA MAX: 26 CM/S
RIGHT RENAL UPPER PARENCHYMA MIN: 8 CM/S
RIGHT RENAL UPPER PARENCHYMA RI: 0.69
STRESS TARGET HR: 123 BPM

## 2023-03-24 PROCEDURE — 93975 VASCULAR STUDY: CPT

## 2023-03-24 PROCEDURE — 93975 VASCULAR STUDY: CPT | Performed by: SURGERY

## 2023-04-17 ENCOUNTER — OFFICE VISIT (OUTPATIENT)
Dept: SLEEP MEDICINE | Facility: HOSPITAL | Age: 73
End: 2023-04-17
Payer: MEDICARE

## 2023-04-17 VITALS
DIASTOLIC BLOOD PRESSURE: 64 MMHG | BODY MASS INDEX: 35.97 KG/M2 | HEART RATE: 55 BPM | WEIGHT: 210.7 LBS | HEIGHT: 64 IN | OXYGEN SATURATION: 96 % | SYSTOLIC BLOOD PRESSURE: 157 MMHG

## 2023-04-17 DIAGNOSIS — G47.21 CIRCADIAN RHYTHM SLEEP DISORDER, DELAYED SLEEP PHASE TYPE: ICD-10-CM

## 2023-04-17 DIAGNOSIS — E66.9 CLASS 2 OBESITY: ICD-10-CM

## 2023-04-17 DIAGNOSIS — G47.33 OSA ON CPAP: Primary | ICD-10-CM

## 2023-04-17 DIAGNOSIS — Z99.89 OSA ON CPAP: Primary | ICD-10-CM

## 2023-04-17 PROBLEM — E66.812 CLASS 2 OBESITY: Status: ACTIVE | Noted: 2023-04-17

## 2023-04-17 PROCEDURE — G0463 HOSPITAL OUTPT CLINIC VISIT: HCPCS

## 2023-04-17 PROCEDURE — 1159F MED LIST DOCD IN RCRD: CPT | Performed by: INTERNAL MEDICINE

## 2023-04-17 PROCEDURE — 1160F RVW MEDS BY RX/DR IN RCRD: CPT | Performed by: INTERNAL MEDICINE

## 2023-04-17 PROCEDURE — 99214 OFFICE O/P EST MOD 30 MIN: CPT | Performed by: INTERNAL MEDICINE

## 2023-12-11 ENCOUNTER — TRANSCRIBE ORDERS (OUTPATIENT)
Dept: ADMINISTRATIVE | Facility: HOSPITAL | Age: 73
End: 2023-12-11
Payer: MEDICARE

## 2023-12-11 DIAGNOSIS — Z78.0 POST-MENOPAUSE: Primary | ICD-10-CM

## 2023-12-22 ENCOUNTER — HOSPITAL ENCOUNTER (OUTPATIENT)
Dept: MAMMOGRAPHY | Facility: HOSPITAL | Age: 73
Discharge: HOME | End: 2023-12-22
Admitting: NURSE PRACTITIONER
Payer: MEDICARE

## 2023-12-22 DIAGNOSIS — Z12.31 SCREENING MAMMOGRAM FOR BREAST CANCER: ICD-10-CM

## 2023-12-22 PROCEDURE — 77063 BREAST TOMOSYNTHESIS BI: CPT

## 2023-12-22 PROCEDURE — 77067 SCR MAMMO BI INCL CAD: CPT

## 2024-02-19 ENCOUNTER — HOSPITAL ENCOUNTER (OUTPATIENT)
Dept: BONE DENSITY | Facility: HOSPITAL | Age: 74
Discharge: HOME | End: 2024-02-19
Admitting: NURSE PRACTITIONER
Payer: MEDICARE

## 2024-02-19 DIAGNOSIS — Z78.0 POST-MENOPAUSE: ICD-10-CM

## 2024-02-19 PROCEDURE — 77080 DXA BONE DENSITY AXIAL: CPT

## 2025-05-21 ENCOUNTER — OFFICE VISIT (OUTPATIENT)
Dept: SLEEP MEDICINE | Facility: HOSPITAL | Age: 75
End: 2025-05-21
Payer: MEDICARE

## 2025-05-21 VITALS
WEIGHT: 217.7 LBS | DIASTOLIC BLOOD PRESSURE: 65 MMHG | BODY MASS INDEX: 37.17 KG/M2 | OXYGEN SATURATION: 96 % | HEIGHT: 64 IN | SYSTOLIC BLOOD PRESSURE: 126 MMHG | HEART RATE: 59 BPM

## 2025-05-21 DIAGNOSIS — G47.33 OSA ON CPAP: Primary | ICD-10-CM

## 2025-05-21 DIAGNOSIS — E66.812 CLASS 2 OBESITY: ICD-10-CM

## 2025-05-21 DIAGNOSIS — I10 ESSENTIAL HYPERTENSION, BENIGN: ICD-10-CM

## 2025-05-21 PROCEDURE — G0463 HOSPITAL OUTPT CLINIC VISIT: HCPCS

## 2025-05-21 PROCEDURE — 99214 OFFICE O/P EST MOD 30 MIN: CPT | Performed by: INTERNAL MEDICINE

## 2025-05-21 PROCEDURE — 3078F DIAST BP <80 MM HG: CPT | Performed by: INTERNAL MEDICINE

## 2025-05-21 PROCEDURE — 1160F RVW MEDS BY RX/DR IN RCRD: CPT | Performed by: INTERNAL MEDICINE

## 2025-05-21 PROCEDURE — 3074F SYST BP LT 130 MM HG: CPT | Performed by: INTERNAL MEDICINE

## 2025-05-21 PROCEDURE — 1159F MED LIST DOCD IN RCRD: CPT | Performed by: INTERNAL MEDICINE

## 2025-05-21 RX ORDER — LEVOCETIRIZINE DIHYDROCHLORIDE 5 MG/1
1 TABLET, FILM COATED ORAL
COMMUNITY
Start: 2024-06-25 | End: 2025-06-25

## 2025-05-21 RX ORDER — AMLODIPINE BESYLATE 5 MG/1
5 TABLET ORAL DAILY
COMMUNITY
Start: 2024-10-22 | End: 2026-05-07

## 2025-05-21 RX ORDER — EMPAGLIFLOZIN 25 MG/1
10 TABLET, FILM COATED ORAL
COMMUNITY
Start: 2024-12-20 | End: 2025-12-20

## 2025-05-21 RX ORDER — TRAZODONE HYDROCHLORIDE 50 MG/1
50 TABLET ORAL DAILY
COMMUNITY
Start: 2025-05-07 | End: 2026-05-07

## 2025-05-21 NOTE — PROGRESS NOTES
"  [facility]  Sleep medicine  [address] [place] [address]  [place]   [place] [address]  Phone: [phone]  Fax: [phone]        SLEEP CLINIC FOLLOW UP PROGRESS NOTE.    Valeria Martínez  [id number]   1950  75 y.o.  female      PCP: Aster Parra APRN      Date of visit: 5/21/2025    Chief Complaint   Patient presents with    Sleep Apnea    Obesity       HPI:  This is a 75 y.o. years old patient is here for the management of obstructive sleep apnea.  Sleep apnea is moderate in severity with a AHI of 14.9/hr. Patient is using positive airway pressure therapy with auto CPAP and the symptoms of sleep apnea have improved significantly on the therapy. Normally patient goes to bed at 4 AM  and wakes up at 9 AM .  The patient wakes up 2 time(s) during the night and has no problem going back to sleep.  Feels refreshed after waking up.     She is a retired nurse used to work in psychiatry.  She normally goes to bed around 1 AM but cannot fall asleep until 4 AM and once she falls asleep at 4 AM she wakes up at 10 9 AM does not have any daytime problems does not take any naps.  This is going on for a number of years.    She had a sleep study on September 14, 2017.  She is eligible for a new CPAP.  CPAP is medically necessary and it is benefiting the patient and her compliance is excellent    Medications and allergies are reviewed by me and documented in the encounter.     SOCIAL (habits pertaining to sleep medicine)  History tobacco use:No   History of alcohol use: 0 per week  Caffeine use: 2     REVIEW OF SYSTEMS:   Pertaining positive symptoms are:  [place] Sleepiness Scale :Total score: 3       PHYSICAL EXAMINATION:  CONSTITUTIONAL:  Vitals:    05/21/25 1100   BP: 126/65   Pulse: 59   SpO2: 96%   Weight: 98.7 kg (217 lb 11.2 oz)   Height: 162.6 cm (64.02\")    Body mass index is 37.35 kg/m².   NOSE: nasal passages are clear, No deformities noted   RESP SYSTEM: Not in any respiratory distress, no " chest deformities noted,   CARDIOVASULAR: No edema noted  NEURO: Oriented x 3, gait normal,  Mood and affect appeared appropriate      The Smart card downloaded on 5/21/2025 has been independently reviewed by me and discussed the data with the patient. It shows the following..  Compliance;97 %  > 4 hr use, 93 %  Average use of the device 6 hours per night  Residual AHI: 2.6 /hr (Optimal < 5/hr, Good <10/hr, Adequate reduce by 75% from baseline)  Mask type: Fullface mask  Device: Demonstration  DME: [facility]        ASSESSMENT AND PLAN:  Obstructive sleep apnea ( G 47.33).  The symptoms of sleep apnea have improved with the device and the treatment.  Patient's compliance with the device is excellent for treatment of sleep apnea.  I have independently reviewed the smart card down load and discussed with the patient the download data and encouarged the patient to continue to use the device.The residual AHI is acceptable. The device is benefiting the patient and the device is medically necessary.  Without proper control of sleep apnea and good compliance there is a increased risk for hypertension, diabetes mellitus and nonrestorative sleep with hypersomnia which can increase risk for motor vehicle accidents.  Untreated sleep apnea is also a risk factor for development of atrial fibrillation, pulmonary hypertension, insulin resistance and stroke.  Patient is eligible for a new CPAP.  I have sent an order to [facility] to get her a new CPAP and see me back in 31 to 90 days for compliance check.  CPAP is medically necessary and it is benefiting the patient and her compliance is excellent.  Hypertension.  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  The patient exhibited delayed sleep to conventional bedtime and has difficulty waking up in the morning. This type of circadian rhythm problem is seen most often in children and adolescents. This is due to shift in the circadian clock. I have talked with him  patient about good sleep habits, maintaining a fixed sleep time and wake time.  In addition need to avoid caffeinated products, alcohol, nicotine and sleeping pills.  In addition patient should avoid activities before bedtime which are stimulating like working on the computer, using smart phone and watching television.  But the patient is a retired and she has a luxury of waking up whenever she wants to wake up.  I have talked to her about only going to bed when she is sleepy and then wake up whenever she has completed her sleep cycle.  There is no need for correction at this time.  Given her education material  Obesity  2 with BMI is Body mass index is 37.35 kg/m².. I have discuss the relationship between the weight and sleep apnea. The benefit of weight loss in reducing severity of sleep apnea was discussed. Discussed diet and exercise with the patient to achieve ideal BMI.   Return for 31 to 90 days after PAP setup with down load. . Patient's questions were answered.    5/21/2025  Osorio Gallo MD  Sleep Medicine.  Medical Director,   [facility], [facility] sleep centers.      Pt states she is getting steroid injections in lower back since 12/2022 at [facility] here in [place].

## 2025-07-23 ENCOUNTER — OFFICE VISIT (OUTPATIENT)
Dept: SLEEP MEDICINE | Facility: HOSPITAL | Age: 75
End: 2025-07-23
Payer: MEDICARE

## 2025-07-23 VITALS
BODY MASS INDEX: 36.7 KG/M2 | DIASTOLIC BLOOD PRESSURE: 45 MMHG | HEIGHT: 64 IN | WEIGHT: 215 LBS | HEART RATE: 57 BPM | SYSTOLIC BLOOD PRESSURE: 110 MMHG | OXYGEN SATURATION: 96 %

## 2025-07-23 DIAGNOSIS — E66.812 CLASS 2 OBESITY: ICD-10-CM

## 2025-07-23 DIAGNOSIS — G47.33 OSA ON CPAP: Primary | ICD-10-CM

## 2025-07-23 DIAGNOSIS — I10 ESSENTIAL HYPERTENSION, BENIGN: ICD-10-CM

## 2025-07-23 PROCEDURE — G0463 HOSPITAL OUTPT CLINIC VISIT: HCPCS

## 2025-07-23 RX ORDER — FAMOTIDINE 20 MG/1
TABLET, FILM COATED ORAL
COMMUNITY
Start: 2025-05-14

## 2025-07-23 NOTE — PROGRESS NOTES
"  [facility]  Sleep medicine  [address] [place] [address]  Phone: [phone]  Fax: [phone]        SLEEP CLINIC FOLLOW UP PROGRESS NOTE.    Valeria Martínez  [id number]   1950  75 y.o.  female      PCP: Aster Parra APRN      Date of visit: 7/23/2025    Chief Complaint   Patient presents with    Sleep Apnea    Obesity       HPI:  This is a 75 y.o. years old patient is here for the management of obstructive sleep apnea.  Sleep apnea is moderate in severity with a AHI of 14.9/hr. Patient is using positive airway pressure therapy with auto CPAP and the symptoms of sleep apnea have improved significantly on the therapy. Normally patient goes to bed at 4 AM  and wakes up at 9 AM .  The patient wakes up 2 time(s) during the night and has no problem going back to sleep.  Feels refreshed after waking up.     She is a retired nurse used to work in psychiatry.  She normally goes to bed around 1 AM but cannot fall asleep until 4 AM and once she falls asleep at 4 AM she wakes up at 10 9 AM does not have any daytime problems does not take any naps.  This is going on for a number of years.      Medications and allergies are reviewed by me and documented in the encounter.     SOCIAL (habits pertaining to sleep medicine)  History tobacco use:No   History of alcohol use: 0 per week  Caffeine use: 2     REVIEW OF SYSTEMS:   Pertaining positive symptoms are:  [place] Sleepiness Scale :Total score: 3       PHYSICAL EXAMINATION:  CONSTITUTIONAL:  Vitals:    07/23/25 1000   BP: 110/45   BP Location: Left arm   Patient Position: Sitting   Pulse: 57   SpO2: 96%   Weight: 97.5 kg (215 lb)   Height: 162.6 cm (64.02\")    Body mass index is 36.89 kg/m².   NOSE: nasal passages are clear, No deformities noted   RESP SYSTEM: Not in any respiratory distress, no chest deformities noted,   CARDIOVASULAR: No edema noted  NEURO: Oriented x 3, gait normal,  Mood and affect appeared " appropriate      The Smart card downloaded on 7/23/2025 has been independently reviewed by me and discussed the data with the patient. It shows the following..  Compliance;100 %  > 4 hr use, 97 %  Average use of the device 7 hours and 16 minutes per night  Residual AHI: 3.3 /hr (Optimal < 5/hr, Good <10/hr, Adequate reduce by 75% from baseline)  Mask type: Fullface mask  Device: ResMed air sense 10  DME: [facility]        ASSESSMENT AND PLAN:  Obstructive sleep apnea ( G 47.33).  The symptoms of sleep apnea have improved with the device and the treatment.  Patient's compliance with the device is excellent for treatment of sleep apnea.  I have independently reviewed the smart card down load and discussed with the patient the download data and encouarged the patient to continue to use the device.The residual AHI is acceptable. The device is benefiting the patient and the device is medically necessary.  Without proper control of sleep apnea and good compliance there is a increased risk for hypertension, diabetes mellitus and nonrestorative sleep with hypersomnia which can increase risk for motor vehicle accidents.  Untreated sleep apnea is also a risk factor for development of atrial fibrillation, pulmonary hypertension, insulin resistance and stroke.    Hypertension.  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  The patient exhibited delayed sleep to conventional bedtime and has difficulty waking up in the morning. This type of circadian rhythm problem is seen most often in children and adolescents. This is due to shift in the circadian clock. I have talked with him patient about good sleep habits, maintaining a fixed sleep time and wake time.  In addition need to avoid caffeinated products, alcohol, nicotine and sleeping pills.  In addition patient should avoid activities before bedtime which are stimulating like working on the computer, using smart phone and watching television.  But the patient is  a retired and she has a luxury of waking up whenever she wants to wake up.  I have talked to her about only going to bed when she is sleepy and then wake up whenever she has completed her sleep cycle.  There is no need for correction at this time.  Given her education material  Obesity  2 with BMI is Body mass index is 36.89 kg/m².. I have discuss the relationship between the weight and sleep apnea. The benefit of weight loss in reducing severity of sleep apnea was discussed. Discussed diet and exercise with the patient to achieve ideal BMI.   Return in about 1 year (around 7/23/2026) for with smart card down load. . Patient's questions were answered.    7/23/2025  Oosrio Gallo MD  Sleep Medicine.  Medical Director,   [facility], [facility] sleep [facility].      Pt states she is getting steroid injections in lower back since 12/2022 at [facility] here in [place].